# Patient Record
Sex: FEMALE | Race: WHITE | Employment: UNEMPLOYED | URBAN - METROPOLITAN AREA
[De-identification: names, ages, dates, MRNs, and addresses within clinical notes are randomized per-mention and may not be internally consistent; named-entity substitution may affect disease eponyms.]

---

## 2024-03-13 ENCOUNTER — HOSPITAL ENCOUNTER (OUTPATIENT)
Age: 54
Discharge: HOME | End: 2024-03-13

## 2024-03-14 ENCOUNTER — HOSPITAL ENCOUNTER (OUTPATIENT)
Age: 54
Setting detail: OBSERVATION
Discharge: HOME | End: 2024-03-15
Attending: INTERNAL MEDICINE | Admitting: INTERNAL MEDICINE
Payer: COMMERCIAL

## 2024-03-14 ENCOUNTER — HOSPITAL ENCOUNTER (OUTPATIENT)
Dept: CT IMAGING | Age: 54
Discharge: HOME | End: 2024-03-14
Payer: COMMERCIAL

## 2024-03-14 VITALS
BODY MASS INDEX: 23.32 KG/M2 | RESPIRATION RATE: 16 BRPM | HEIGHT: 65 IN | TEMPERATURE: 97.1 F | HEART RATE: 85 BPM | OXYGEN SATURATION: 96 % | WEIGHT: 140 LBS | SYSTOLIC BLOOD PRESSURE: 108 MMHG | DIASTOLIC BLOOD PRESSURE: 72 MMHG

## 2024-03-14 DIAGNOSIS — C79.51 SECONDARY MALIGNANT NEOPLASM OF BONE AND BONE MARROW (HCC): ICD-10-CM

## 2024-03-14 DIAGNOSIS — Z98.890 POST-OPERATIVE STATE: Primary | ICD-10-CM

## 2024-03-14 DIAGNOSIS — C79.52 SECONDARY MALIGNANT NEOPLASM OF BONE AND BONE MARROW (HCC): ICD-10-CM

## 2024-03-14 PROBLEM — I10 HYPERTENSION: Status: ACTIVE | Noted: 2024-03-14

## 2024-03-14 PROBLEM — E78.5 HYPERLIPIDEMIA: Status: ACTIVE | Noted: 2024-03-14

## 2024-03-14 LAB
DEPRECATED RDW RBC AUTO: 16 % (ref 12.4–15.4)
FLUAV RNA RESP QL NAA+PROBE: DETECTED
FLUBV RNA RESP QL NAA+PROBE: NOT DETECTED
HCG UR QL: NEGATIVE
HCT VFR BLD AUTO: 40.2 % (ref 36–48)
HGB BLD-MCNC: 13.3 G/DL (ref 12–16)
INR PPP: 0.99 (ref 0.84–1.16)
MCH RBC QN AUTO: 28.9 PG (ref 26–34)
MCHC RBC AUTO-ENTMCNC: 33.1 G/DL (ref 31–36)
MCV RBC AUTO: 87.2 FL (ref 80–100)
PLATELET # BLD AUTO: 274 K/UL (ref 135–450)
PMV BLD AUTO: 7.9 FL (ref 5–10.5)
PROTHROMBIN TIME: 13.1 SEC (ref 11.5–14.8)
RBC # BLD AUTO: 4.61 M/UL (ref 4–5.2)
SARS-COV-2 RDRP RESP QL NAA+PROBE: NOT DETECTED
SARS-COV-2 RNA RESP QL NAA+PROBE: NOT DETECTED
WBC # BLD AUTO: 5.3 K/UL (ref 4–11)

## 2024-03-14 PROCEDURE — 6370000000 HC RX 637 (ALT 250 FOR IP)

## 2024-03-14 PROCEDURE — 85610 PROTHROMBIN TIME: CPT

## 2024-03-14 PROCEDURE — 96374 THER/PROPH/DIAG INJ IV PUSH: CPT

## 2024-03-14 PROCEDURE — 87635 SARS-COV-2 COVID-19 AMP PRB: CPT

## 2024-03-14 PROCEDURE — 87636 SARSCOV2 & INF A&B AMP PRB: CPT

## 2024-03-14 PROCEDURE — 6360000002 HC RX W HCPCS: Performed by: INTERNAL MEDICINE

## 2024-03-14 PROCEDURE — 2700000000 HC OXYGEN THERAPY PER DAY

## 2024-03-14 PROCEDURE — 6370000000 HC RX 637 (ALT 250 FOR IP): Performed by: INTERNAL MEDICINE

## 2024-03-14 PROCEDURE — G0378 HOSPITAL OBSERVATION PER HR: HCPCS

## 2024-03-14 PROCEDURE — G0379 DIRECT REFER HOSPITAL OBSERV: HCPCS

## 2024-03-14 PROCEDURE — 36415 COLL VENOUS BLD VENIPUNCTURE: CPT

## 2024-03-14 PROCEDURE — 2580000003 HC RX 258

## 2024-03-14 PROCEDURE — 94640 AIRWAY INHALATION TREATMENT: CPT

## 2024-03-14 PROCEDURE — 84703 CHORIONIC GONADOTROPIN ASSAY: CPT

## 2024-03-14 PROCEDURE — 2709999900 CT BIOPSY DEEP BONE PERCUTANEOUS

## 2024-03-14 PROCEDURE — 6360000002 HC RX W HCPCS: Performed by: STUDENT IN AN ORGANIZED HEALTH CARE EDUCATION/TRAINING PROGRAM

## 2024-03-14 PROCEDURE — 99221 1ST HOSP IP/OBS SF/LOW 40: CPT

## 2024-03-14 PROCEDURE — 85027 COMPLETE CBC AUTOMATED: CPT

## 2024-03-14 PROCEDURE — 94761 N-INVAS EAR/PLS OXIMETRY MLT: CPT

## 2024-03-14 RX ORDER — POTASSIUM CHLORIDE 7.45 MG/ML
10 INJECTION INTRAVENOUS PRN
Status: DISCONTINUED | OUTPATIENT
Start: 2024-03-14 | End: 2024-03-15 | Stop reason: HOSPADM

## 2024-03-14 RX ORDER — ACETAMINOPHEN 325 MG/1
650 TABLET ORAL EVERY 6 HOURS PRN
Status: DISCONTINUED | OUTPATIENT
Start: 2024-03-14 | End: 2024-03-15 | Stop reason: HOSPADM

## 2024-03-14 RX ORDER — SODIUM CHLORIDE 0.9 % (FLUSH) 0.9 %
5-40 SYRINGE (ML) INJECTION PRN
Status: DISCONTINUED | OUTPATIENT
Start: 2024-03-14 | End: 2024-03-15 | Stop reason: HOSPADM

## 2024-03-14 RX ORDER — OXYCODONE HYDROCHLORIDE AND ACETAMINOPHEN 5; 325 MG/1; MG/1
1 TABLET ORAL EVERY 4 HOURS PRN
Status: DISCONTINUED | OUTPATIENT
Start: 2024-03-14 | End: 2024-03-15 | Stop reason: HOSPADM

## 2024-03-14 RX ORDER — OMEPRAZOLE 20 MG/1
20 CAPSULE, DELAYED RELEASE ORAL DAILY
Status: DISCONTINUED | OUTPATIENT
Start: 2024-03-14 | End: 2024-03-15 | Stop reason: HOSPADM

## 2024-03-14 RX ORDER — FENTANYL CITRATE 50 UG/ML
INJECTION, SOLUTION INTRAMUSCULAR; INTRAVENOUS PRN
Status: COMPLETED | OUTPATIENT
Start: 2024-03-14 | End: 2024-03-14

## 2024-03-14 RX ORDER — POLYETHYLENE GLYCOL 3350 17 G/17G
17 POWDER, FOR SOLUTION ORAL DAILY PRN
Status: DISCONTINUED | OUTPATIENT
Start: 2024-03-14 | End: 2024-03-15 | Stop reason: HOSPADM

## 2024-03-14 RX ORDER — ONDANSETRON 4 MG/1
4 TABLET, ORALLY DISINTEGRATING ORAL EVERY 8 HOURS PRN
Status: DISCONTINUED | OUTPATIENT
Start: 2024-03-14 | End: 2024-03-15 | Stop reason: HOSPADM

## 2024-03-14 RX ORDER — ACETAMINOPHEN 325 MG/1
650 TABLET ORAL EVERY 4 HOURS PRN
Status: DISCONTINUED | OUTPATIENT
Start: 2024-03-14 | End: 2024-03-14

## 2024-03-14 RX ORDER — ALBUTEROL SULFATE 90 UG/1
2 AEROSOL, METERED RESPIRATORY (INHALATION)
Status: DISCONTINUED | OUTPATIENT
Start: 2024-03-14 | End: 2024-03-15 | Stop reason: HOSPADM

## 2024-03-14 RX ORDER — SODIUM CHLORIDE 0.9 % (FLUSH) 0.9 %
5-40 SYRINGE (ML) INJECTION EVERY 12 HOURS SCHEDULED
Status: DISCONTINUED | OUTPATIENT
Start: 2024-03-14 | End: 2024-03-15 | Stop reason: HOSPADM

## 2024-03-14 RX ORDER — ONDANSETRON 2 MG/ML
4 INJECTION INTRAMUSCULAR; INTRAVENOUS EVERY 8 HOURS PRN
Status: DISCONTINUED | OUTPATIENT
Start: 2024-03-14 | End: 2024-03-14

## 2024-03-14 RX ORDER — HYDROCODONE BITARTRATE AND ACETAMINOPHEN 5; 325 MG/1; MG/1
1 TABLET ORAL EVERY 6 HOURS PRN
Status: DISCONTINUED | OUTPATIENT
Start: 2024-03-14 | End: 2024-03-15 | Stop reason: HOSPADM

## 2024-03-14 RX ORDER — ALBUTEROL SULFATE 90 UG/1
2 AEROSOL, METERED RESPIRATORY (INHALATION) EVERY 6 HOURS PRN
Status: DISCONTINUED | OUTPATIENT
Start: 2024-03-14 | End: 2024-03-15 | Stop reason: HOSPADM

## 2024-03-14 RX ORDER — POTASSIUM CHLORIDE 20 MEQ/1
40 TABLET, EXTENDED RELEASE ORAL PRN
Status: DISCONTINUED | OUTPATIENT
Start: 2024-03-14 | End: 2024-03-15 | Stop reason: HOSPADM

## 2024-03-14 RX ORDER — NICOTINE 21 MG/24HR
1 PATCH, TRANSDERMAL 24 HOURS TRANSDERMAL DAILY
Status: DISCONTINUED | OUTPATIENT
Start: 2024-03-14 | End: 2024-03-15 | Stop reason: HOSPADM

## 2024-03-14 RX ORDER — SODIUM CHLORIDE 9 MG/ML
INJECTION, SOLUTION INTRAVENOUS PRN
Status: DISCONTINUED | OUTPATIENT
Start: 2024-03-14 | End: 2024-03-15 | Stop reason: HOSPADM

## 2024-03-14 RX ORDER — SODIUM CHLORIDE 0.9 % (FLUSH) 0.9 %
10 SYRINGE (ML) INJECTION PRN
Status: DISCONTINUED | OUTPATIENT
Start: 2024-03-14 | End: 2024-03-15 | Stop reason: HOSPADM

## 2024-03-14 RX ORDER — BENZONATATE 100 MG/1
200 CAPSULE ORAL 3 TIMES DAILY
Status: DISCONTINUED | OUTPATIENT
Start: 2024-03-14 | End: 2024-03-15 | Stop reason: HOSPADM

## 2024-03-14 RX ORDER — MORPHINE SULFATE 2 MG/ML
2 INJECTION, SOLUTION INTRAMUSCULAR; INTRAVENOUS ONCE
Status: COMPLETED | OUTPATIENT
Start: 2024-03-14 | End: 2024-03-14

## 2024-03-14 RX ORDER — ACETAMINOPHEN 650 MG/1
650 SUPPOSITORY RECTAL EVERY 6 HOURS PRN
Status: DISCONTINUED | OUTPATIENT
Start: 2024-03-14 | End: 2024-03-15 | Stop reason: HOSPADM

## 2024-03-14 RX ORDER — ATORVASTATIN CALCIUM 40 MG/1
40 TABLET, FILM COATED ORAL DAILY
Status: DISCONTINUED | OUTPATIENT
Start: 2024-03-14 | End: 2024-03-15 | Stop reason: HOSPADM

## 2024-03-14 RX ORDER — ONDANSETRON 2 MG/ML
4 INJECTION INTRAMUSCULAR; INTRAVENOUS EVERY 6 HOURS PRN
Status: DISCONTINUED | OUTPATIENT
Start: 2024-03-14 | End: 2024-03-15 | Stop reason: HOSPADM

## 2024-03-14 RX ORDER — OXYCODONE HYDROCHLORIDE AND ACETAMINOPHEN 5; 325 MG/1; MG/1
2 TABLET ORAL EVERY 4 HOURS PRN
Status: DISCONTINUED | OUTPATIENT
Start: 2024-03-14 | End: 2024-03-15 | Stop reason: HOSPADM

## 2024-03-14 RX ORDER — MIDAZOLAM HYDROCHLORIDE 1 MG/ML
INJECTION INTRAMUSCULAR; INTRAVENOUS PRN
Status: COMPLETED | OUTPATIENT
Start: 2024-03-14 | End: 2024-03-14

## 2024-03-14 RX ORDER — MAGNESIUM SULFATE 1 G/100ML
1000 INJECTION INTRAVENOUS PRN
Status: DISCONTINUED | OUTPATIENT
Start: 2024-03-14 | End: 2024-03-15 | Stop reason: HOSPADM

## 2024-03-14 RX ADMIN — Medication 2 PUFF: at 19:53

## 2024-03-14 RX ADMIN — FENTANYL CITRATE 50 MCG: 50 INJECTION INTRAMUSCULAR; INTRAVENOUS at 10:42

## 2024-03-14 RX ADMIN — DESMOPRESSIN ACETATE 40 MG: 0.2 TABLET ORAL at 16:58

## 2024-03-14 RX ADMIN — FENTANYL CITRATE 50 MCG: 50 INJECTION INTRAMUSCULAR; INTRAVENOUS at 10:53

## 2024-03-14 RX ADMIN — MIDAZOLAM 1 MG: 1 INJECTION INTRAMUSCULAR; INTRAVENOUS at 10:42

## 2024-03-14 RX ADMIN — MIDAZOLAM 1 MG: 1 INJECTION INTRAMUSCULAR; INTRAVENOUS at 10:54

## 2024-03-14 RX ADMIN — MORPHINE SULFATE 2 MG: 2 INJECTION, SOLUTION INTRAMUSCULAR; INTRAVENOUS at 22:07

## 2024-03-14 RX ADMIN — BENZONATATE 200 MG: 100 CAPSULE ORAL at 22:08

## 2024-03-14 RX ADMIN — OMEPRAZOLE 20 MG: 20 CAPSULE, DELAYED RELEASE ORAL at 16:58

## 2024-03-14 RX ADMIN — Medication 10 ML: at 22:07

## 2024-03-14 RX ADMIN — HYDROCODONE BITARTRATE AND ACETAMINOPHEN 1 TABLET: 5; 325 TABLET ORAL at 16:58

## 2024-03-14 ASSESSMENT — PAIN SCALES - GENERAL
PAINLEVEL_OUTOF10: 9
PAINLEVEL_OUTOF10: 0

## 2024-03-14 ASSESSMENT — PAIN - FUNCTIONAL ASSESSMENT
PAIN_FUNCTIONAL_ASSESSMENT: PREVENTS OR INTERFERES SOME ACTIVE ACTIVITIES AND ADLS
PAIN_FUNCTIONAL_ASSESSMENT: NONE - DENIES PAIN

## 2024-03-14 ASSESSMENT — PAIN DESCRIPTION - ORIENTATION: ORIENTATION: MID

## 2024-03-14 ASSESSMENT — PAIN DESCRIPTION - LOCATION: LOCATION: BACK

## 2024-03-14 ASSESSMENT — PAIN DESCRIPTION - DESCRIPTORS: DESCRIPTORS: SHARP

## 2024-03-14 NOTE — PRE SEDATION
Sedation Pre-Procedure Note    Patient Name: Neyda Weinberg   YOB: 1970  Room/Bed: Room/bed info not found  Medical Record Number: [id number]  Date: 3/14/2024   Time: 10:25 AM       Indication:  T6 sclerosis, concerning for metastatic disease. Image guided sampling requested for further evaluation.     Consent: I have discussed with the patient and/or the patient representative the indication, alternatives, and the possible risks and/or complications of the planned procedure and the anesthesia methods. The patient and/or patient representative appear to understand and agree to proceed.    Vital Signs:   Vitals:    24 0736   BP: 115/66   Pulse: 84   Resp: 16   Temp: 97.1 °F (36.2 °C)   SpO2: 95%       Past Medical History:   has a past medical history of Aneurysm (HCC), Cervical mass, Lung cancer, upper lobe (HCC), and Lung nodule.    Past Surgical History:   has a past surgical history that includes Cholecystectomy, laparoscopic (3-); Breast surgery (-);  section; and Tubal ligation.    Medications:   Scheduled Meds:    sodium chloride flush  5-40 mL IntraVENous 2 times per day     Continuous Infusions:    sodium chloride       PRN Meds: sodium chloride flush, sodium chloride  Home Meds:   Prior to Admission medications    Medication Sig Start Date End Date Taking? Authorizing Provider   acetaminophen (TYLENOL) 80 MG chewable tablet Take 650 mg by mouth every 4 hours as needed for Pain    Cl Romano MD   HYDROcodone-acetaminophen (NORCO) 7.5-325 MG per tablet Take 1 tablet by mouth every 6 hours as needed for Pain.    Cl Romano MD   lisinopril (PRINIVIL;ZESTRIL) 5 MG tablet Take 1 tablet by mouth daily    Cl Romano MD   mometasone (ASMANEX) 200 MCG/ACT AERO inhaler Inhale 2 puffs into the lungs 2 times daily    Cl Romano MD   pregabalin (LYRICA) 150 MG capsule Take 1 capsule by mouth 2 times daily.    Cl Romano MD

## 2024-03-14 NOTE — H&P
Hospital Medicine History & Physical      PCP: No primary care provider on file.    Date of Admission: 3/14/2024    Date of Service: Pt seen/examined on 24      Chief Complaint:  No chief complaint on file.        History Of Present Illness:      The patient is a 53 y.o. female with PMHx HLD, HTN, and iron deficiency anemia currently being evaluated for suspected malignancy with T6 lesion and lung nodule. Patient admitted to [facility] following IR guided biopsy of T6 lesion this afternoon. Patient currently resides in homeless shelter without transportation and therefore was unable to safely be discharged after receiving anesthesia. Patient endorses mild pain, but otherwise, no fevers, chills, n/v/d, abdominal pain, chest pain or SOB.      Past Medical History:        Diagnosis Date    Aneurysm (HCC)     has a coil    Cervical mass Dx in     Lung cancer, upper lobe (HCC)     RUL, dx 1 yr ago    Lung nodule Dx  in     repeat CT scan in 6 months       Past Surgical History:        Procedure Laterality Date    BREAST SURGERY  -    abscess removed     SECTION      x 3    CHOLECYSTECTOMY, LAPAROSCOPIC  3-2009    CT BIOPSY PERCUTANEOUS DEEP BONE  3/14/2024    CT BIOPSY PERCUTANEOUS DEEP BONE 3/14/2024 [facility] [place] SCAN    TUBAL LIGATION         Medications Prior to Admission:    Prior to Admission medications    Medication Sig Start Date End Date Taking? Authorizing Provider   acetaminophen (TYLENOL) 80 MG chewable tablet Take 650 mg by mouth every 4 hours as needed for Pain    Cl Romano MD   HYDROcodone-acetaminophen (NORCO) 7.5-325 MG per tablet Take 1 tablet by mouth every 6 hours as needed for Pain.    ProviderCl MD   lisinopril (PRINIVIL;ZESTRIL) 5 MG tablet Take 1 tablet by mouth daily    Cl Romano MD   mometasone (ASMANEX) 200 MCG/ACT AERO inhaler Inhale 2 puffs into the lungs 2 times daily    Cl Romano MD   pregabalin (LYRICA) 150 MG

## 2024-03-14 NOTE — PROGRESS NOTES
RT Inhaler-Nebulizer Bronchodilator Protocol Note    There is a bronchodilator order in the chart from a provider indicating to follow the RT Bronchodilator Protocol and there is an “Initiate RT Inhaler-Nebulizer Bronchodilator Protocol” order as well (see protocol at bottom of note).    CXR Findings:  No results found.    The findings from the last RT Protocol Assessment were as follows:   History Pulmonary Disease: (P) Smoker 15 pack years or more  Respiratory Pattern: (P) Dyspnea on exertion or RR 21-25 bpm  Breath Sounds: (P) Inspiratory and expiratory or bilateral wheezing and/or rhonchi  Cough: (P) Strong, productive  Indication for Bronchodilator Therapy: (P) Wheezing associated with pulm disorder  Bronchodilator Assessment Score: (P) 10    Aerosolized bronchodilator medication orders have been revised according to the RT Inhaler-Nebulizer Bronchodilator Protocol below.    Respiratory Therapist to perform RT Therapy Protocol Assessment initially then follow the protocol.  Repeat RT Therapy Protocol Assessment PRN for score 0-3 or on second treatment, BID, and PRN for scores above 3.    No Indications - adjust the frequency to every 6 hours PRN wheezing or bronchospasm, if no treatments needed after 48 hours then discontinue using Per Protocol order mode.     If indication present, adjust the RT bronchodilator orders based on the Bronchodilator Assessment Score as indicated below.  Use Inhaler orders unless patient has one or more of the following: on home nebulizer, not able to hold breath for 10 seconds, is not alert and oriented, cannot activate and use MDI correctly, or respiratory rate 25 breaths per minute or more, then use the equivalent nebulizer order(s) with same Frequency and PRN reasons based on the score.  If a patient is on this medication at home then do not decrease Frequency below that used at home.    0-3 - enter or revise RT bronchodilator order(s) to equivalent RT Bronchodilator order with

## 2024-03-14 NOTE — FLOWSHEET NOTE
03/14/24 1419   Vital Signs   Temp 97.9 °F (36.6 °C)   Temp Source Oral   Pulse 86   Heart Rate Source Monitor   Respirations 18   /64   MAP (Calculated) 77   BP Location Left upper arm   BP Method Automatic   Patient Position Semi fowlers   Pain Assessment   Pain Assessment None - Denies Pain   Opioid-Induced Sedation   POSS Score 1   RASS   White Agitation Sedation Scale (RASS) 0   Oxygen Therapy   SpO2 91 %   O2 Device Nasal cannula   O2 Flow Rate (L/min) 1 L/min   Height and Weight   Height 1.631 m (5' 4.21\")   Weight - Scale 63.7 kg (140 lb 6.9 oz)   BSA (Calculated - sq m) 1.7 sq meters   BMI (Calculated) 24     Pt arrived to room 214-2 from OR/PACU. Orders released and admission questions started. VS complete, see flow sheet. Call light within reach, bed locked and in lowest position. Bed alarm in place. No further needs noted.    Recliner  Patient is able to demonstrate the ability to move from a reclining position to an upright position within the recliner.     4 Eyes Skin Assessment     The patient is being assess for   Admission    I agree that 2 RN's have performed a thorough Head to Toe Skin Assessment on the patient. ALL assessment sites listed below have been assessed.      Areas assessed for pressure by both nurses:   [x]   Head, Face, and Ears   [x]   Shoulders, Back, and Chest, Abdomen  [x]   Arms, Elbows, and Hands   []   Coccyx, Sacrum, and Ischium  [x]   Legs, Feet, and Heels    Patient has scattered bruises and abrasions. Incision on back from procedure, covered with band-aid. Patient denies any other wounds.        Skin Assessed Under all Medical Devices by both nurses:  O2 device tubing              All Mepilex Borders were peeled back and area peeked at by both nurses:  No: N/A  Please list where Mepilex Borders are located:  N/A             **SHARE this note so that the co-signing nurse is able to place an eSignature**    Co-signer eSignature: Electronically signed by Krishna  Inskeep, RN on 3/14/24 at 3:12 PM EDT    Does the Patient have Skin Breakdown related to pressure?  No     (Insert Photo here n/a)         Rohan Prevention initiated:  No   Wound Care Orders initiated:  No      WOC nurse consulted for Pressure Injury (Stage 3,4, Unstageable, DTI, NWPT, Complex wounds)and New or Established Ostomies:  No      Primary Nurse eSignature: Electronically signed by Lizz Monique RN (Mandy) on 3/14/24 at 2:23 PM EDT

## 2024-03-14 NOTE — OR NURSING
Image guided bone lesion biopsy T6 completed by Dr. Cintron. Pt tolerated procedure without any signs or symptoms of distress. Vital signs stable. Report given  to [facility] RN. Pt transported back to [facility] in stable condition via stretcher.     Total Biopsy: 2  Received: Versed: 2 mg       Fentanyl: 100 mcg  Bandage to back that is clean dry and intact.   Patient to lay on back side for 1 hour.     Vital Signs  Vitals:    03/14/24 1109   BP: 101/60   Pulse: 77   Resp: 14   Temp:    SpO2: 99%    (vital signs in table format)    Post Kodak  2 - Able to move 4 extremities voluntarily on command  2 - BP+/- 20mmHg of normal  2 - Fully awake  2 - Able to maintain oxygen saturation >92% on room air  2 - Able to breathe deeply and cough freely

## 2024-03-14 NOTE — PROGRESS NOTES
Handoff report and transfer of care given at bedside to [facility].  Patient in stable condition, denies needs/concerns at this time.  Call light within reach.

## 2024-03-14 NOTE — BRIEF OP NOTE
Brief Postoperative Note    Patient: Neyda Weinberg  YOB: 1970  MRN: [id number]      Pre-operative Diagnosis: T6 vertebral body lesion    Post-operative Diagnosis: Same    Procedure: CT guided bone lesion biopsy    Anesthesia: Local and Moderate Sedation    Surgeons/Assistants: Hira Cintron DO    Estimated Blood Loss: less than 50     Complications: None    Specimens: Was Obtained: two core samples    Findings:   Successful CT guided T6 bone lesion biopsy via a left transpedicular approach.  Post biopsy CT images demonstrated no complication.    Patient to be recovered in [facility] then admitted overnight for observation as per extensive discussion prior to biopsy given social circumstances.       Hira Cintron DO  3/14/2024, 11:13 AM  Interventional Radiology  [phone] ([phone])

## 2024-03-14 NOTE — ACP (ADVANCE CARE PLANNING)
Advance Care Planning     General Advance Care Planning (ACP) Conversation    Date of Conversation: 3/14/2024  Conducted with: Patient with Decision Making Capacity    Healthcare Decision Maker:  No healthcare decision makers have been documented.  Click here to complete HealthCare Decision Makers including selection of the Healthcare Decision Maker Relationship (ie \"Primary\")   Today we documented Decision Maker(s) consistent with Legal Next of Kin hierarchy.    Content/Action Overview:  DECLINED ACP Conversation - will revisit periodically  Reviewed DNR/DNI and patient elects Full Code (Attempt Resuscitation)        Length of Voluntary ACP Conversation in minutes:  <16 minutes (Non-Billable)    Tasha Penny

## 2024-03-14 NOTE — OR NURSING
Pt arrived for image guided bone lesion biopsy T6. Procedure explained including the risk and benefits of the procedure. All questions answered. Pt verbalizes understanding of the of procedure and states no more questions. Consent signed. Vital signs stable, labs, allergies, medications, and code status reviewed. No contraindications noted.     Vitals:    03/14/24 1034   BP: 111/62   Pulse: 83   Resp: 14   Temp:    SpO2: 97%    (vital signs in table format)    Kodak Score  2 - Able to move 4 extremities voluntarily on command  2 - BP+/- 20mmHg of normal  2 - Fully awake  2 - Able to maintain oxygen saturation >92% on room air  2 - Able to breathe deeply and cough freely    Allergies  Rocephin [ceftriaxone] and Toradol [ketorolac tromethamine]    Labs  Lab Results   Component Value Date    INR 0.99 03/14/2024    PROTIME 13.1 03/14/2024       No results found for: \"CREATININE\", \"BUN\", \"GFR\", \"NA\", \"K\", \"CL\", \"CO2\"    Lab Results   Component Value Date    WBC 5.3 03/14/2024    HGB 13.3 03/14/2024    HCT 40.2 03/14/2024    MCV 87.2 03/14/2024     03/14/2024

## 2024-03-14 NOTE — CARE COORDINATION
Case Management Assessment  Initial Evaluation    Date/Time of Evaluation: 3/14/2024 2:47 PM  Assessment Completed by: Tasha Penny    If patient is discharged prior to next notation, then this note serves as note for discharge by case management.    Patient Name: Neyda Weinberg                   YOB: 1970  Diagnosis: Post-operative state [Z98.890]                   Date / Time: 3/14/2024  2:00 PM    Patient Admission Status: Observation   Readmission Risk (Low < 19, Mod (19-27), High > 27): No data recorded  Current PCP: No primary care provider on file.  PCP verified by CM? Yes ([facility])    Chart Reviewed: Yes      History Provided by: Patient  Patient Orientation: Alert and Oriented    Patient Cognition: Alert    Hospitalization in the last 30 days (Readmission):  Yes    If yes, Readmission Assessment in CM Navigator will be completed.    Advance Directives:      Code Status: Prior   Patient's Primary Decision Maker is: Patient Declined (Legal Next of Kin Remains as Decision Maker)      Discharge Planning:    Patient lives with: Other (Comment) (shelter) Type of Home: Other (Comment) (homeless shelter)  Primary Care Giver: Self  Patient Support Systems include: Friends/Neighbors   Current Financial resources: Medicaid  Current community resources: Housing (Homeless Shelter)  Current services prior to admission: None            Current DME:              Type of Home Care services:  None    ADLS  Prior functional level: Independent in ADLs/IADLs  Current functional level: Independent in ADLs/IADLs    PT AM-PAC:   /24  OT AM-PAC:   /24    Family can provide assistance at DC: No  Would you like Case Management to discuss the discharge plan with any other family members/significant others, and if so, who? No  Plans to Return to Present Housing: Yes  Other Identified Issues/Barriers to RETURNING to current housing: none  Potential Assistance needed at discharge: N/A             Potential DME:    Patient expects to discharge to: Shelter  Plan for transportation at discharge:      Financial    Payor: [facility] / Plan: [facility] [place] / Product Type: *No Product type* /     Does insurance require precert for SNF: Yes    Potential assistance Purchasing Medications: No  Meds-to-Beds request:        [id number] - [place], [place] - [address] - P [phone] - F [phone]  [address] [address]  Phone: [phone] Fax: [phone]      Notes:    Factors facilitating achievement of predicted outcomes: Motivated, Cooperative, and Pleasant    Barriers to discharge: none - pain meds need to wear off    Additional Case Management Notes: Reviewed chart and met with pt at [facility]. From [facility], will return at NH. Pt has number and can set her own transportation up thru [employer] ([phone] - lori).  IPTA. No DME or HC PTA. No needs identified at this time. Will continue to monitor.     The Plan for Transition of Care is related to the following treatment goals of Post-operative state [Z98.890]    IF APPLICABLE: The Patient and/or patient representative Neyda and her family were provided with a choice of provider and agrees with the discharge plan. Freedom of choice list with basic dialogue that supports the patient's individualized plan of care/goals and shares the quality data associated with the providers was provided to:     Patient Representative Name:       The Patient and/or Patient Representative Agree with the Discharge Plan?      Tasha Penny  Case Management Department  Ph: [phone] Fax: [phone]

## 2024-03-15 VITALS
OXYGEN SATURATION: 96 % | DIASTOLIC BLOOD PRESSURE: 69 MMHG | HEIGHT: 64 IN | SYSTOLIC BLOOD PRESSURE: 109 MMHG | HEART RATE: 75 BPM | RESPIRATION RATE: 16 BRPM | WEIGHT: 143.31 LBS | BODY MASS INDEX: 24.46 KG/M2 | TEMPERATURE: 98 F

## 2024-03-15 PROCEDURE — 6370000000 HC RX 637 (ALT 250 FOR IP): Performed by: INTERNAL MEDICINE

## 2024-03-15 PROCEDURE — G0378 HOSPITAL OBSERVATION PER HR: HCPCS

## 2024-03-15 PROCEDURE — 99238 HOSP IP/OBS DSCHRG MGMT 30/<: CPT

## 2024-03-15 PROCEDURE — 94640 AIRWAY INHALATION TREATMENT: CPT

## 2024-03-15 PROCEDURE — 2700000000 HC OXYGEN THERAPY PER DAY

## 2024-03-15 PROCEDURE — 94761 N-INVAS EAR/PLS OXIMETRY MLT: CPT

## 2024-03-15 PROCEDURE — 6370000000 HC RX 637 (ALT 250 FOR IP)

## 2024-03-15 RX ORDER — BUDESONIDE AND FORMOTEROL FUMARATE DIHYDRATE 160; 4.5 UG/1; UG/1
2 AEROSOL RESPIRATORY (INHALATION)
Status: DISCONTINUED | OUTPATIENT
Start: 2024-03-15 | End: 2024-03-15 | Stop reason: HOSPADM

## 2024-03-15 RX ORDER — HYDROCODONE BITARTRATE AND ACETAMINOPHEN 5; 325 MG/1; MG/1
1 TABLET ORAL EVERY 8 HOURS PRN
Qty: 9 TABLET | Refills: 0 | Status: SHIPPED | OUTPATIENT
Start: 2024-03-15 | End: 2024-03-18

## 2024-03-15 RX ADMIN — DESMOPRESSIN ACETATE 40 MG: 0.2 TABLET ORAL at 09:28

## 2024-03-15 RX ADMIN — BENZONATATE 200 MG: 100 CAPSULE ORAL at 09:28

## 2024-03-15 RX ADMIN — Medication 2 PUFF: at 07:21

## 2024-03-15 RX ADMIN — HYDROCODONE BITARTRATE AND ACETAMINOPHEN 1 TABLET: 5; 325 TABLET ORAL at 09:28

## 2024-03-15 RX ADMIN — OMEPRAZOLE 20 MG: 20 CAPSULE, DELAYED RELEASE ORAL at 09:28

## 2024-03-15 ASSESSMENT — PAIN - FUNCTIONAL ASSESSMENT: PAIN_FUNCTIONAL_ASSESSMENT: PREVENTS OR INTERFERES SOME ACTIVE ACTIVITIES AND ADLS

## 2024-03-15 ASSESSMENT — PAIN DESCRIPTION - PAIN TYPE: TYPE: CHRONIC PAIN

## 2024-03-15 ASSESSMENT — PAIN DESCRIPTION - LOCATION: LOCATION: BACK;RIB CAGE

## 2024-03-15 ASSESSMENT — PAIN DESCRIPTION - ONSET: ONSET: ON-GOING

## 2024-03-15 ASSESSMENT — PAIN DESCRIPTION - FREQUENCY: FREQUENCY: CONTINUOUS

## 2024-03-15 ASSESSMENT — PAIN SCALES - GENERAL: PAINLEVEL_OUTOF10: 9

## 2024-03-15 ASSESSMENT — PAIN DESCRIPTION - ORIENTATION: ORIENTATION: MID

## 2024-03-15 ASSESSMENT — PAIN DESCRIPTION - DESCRIPTORS: DESCRIPTORS: SHARP

## 2024-03-15 NOTE — FLOWSHEET NOTE
03/15/24 0915   Vital Signs   Temp 98 °F (36.7 °C)   Temp Source Oral   Pulse 75   Heart Rate Source Monitor   Respirations 16   /69   MAP (Calculated) 82   BP Location Right upper arm   BP Method Automatic   Patient Position Semi fowlers   Pain Assessment   Pain Assessment 0-10   Pain Level 9   Pain Location Back;Rib cage   Pain Orientation Mid   Pain Descriptors Sharp   Functional Pain Assessment Prevents or interferes some active activities and ADLs   Pain Type Chronic pain   Pain Frequency Continuous   Pain Onset On-going   Opioid-Induced Sedation   POSS Score 1   Oxygen Therapy   SpO2 96 %   O2 Device None (Room air)     Shift assessment complete. See flow sheet. Scheduled medications given, See MAR.   Head to toe complete. Vital signs logged and active bowel sounds in all 4 quadrants.    Pt awake in bed. Medications taken without difficulty. PRN pain medication given, see MAR.       No further needs noted at this time. Call light and bedside table within reach. Bed in lowest position, wheels locked and side rails up x2.     Amy Self RN

## 2024-03-15 NOTE — FLOWSHEET NOTE
03/14/24 2207   Pain Assessment   Pain Assessment 0-10   Pain Level 9   Patient's Stated Pain Goal 0 - No pain   Pain Location Back   Pain Orientation Mid   Pain Descriptors Sharp   Functional Pain Assessment Prevents or interferes some active activities and ADLs   Non-Pharmaceutical Pain Intervention(s) Repositioned     Morphine 2 mg IV given once as ordered. Tessalon given for cough. Refer to MAR.

## 2024-03-15 NOTE — PLAN OF CARE
Problem: Discharge Planning  Goal: Discharge to home or other facility with appropriate resources  3/15/2024 1030 by Amy Self, RN  Outcome: Progressing  Flowsheets (Taken 3/15/2024 0927)  Discharge to home or other facility with appropriate resources: Identify barriers to discharge with patient and caregiver  3/14/2024 2326 by Kvng Winkler, RN  Outcome: Progressing  Flowsheets (Taken 3/14/2024 2107)  Discharge to home or other facility with appropriate resources: Identify barriers to discharge with patient and caregiver     Problem: Pain  Goal: Verbalizes/displays adequate comfort level or baseline comfort level  3/15/2024 1030 by Amy Self, RN  Outcome: Progressing  3/14/2024 2326 by Kvng Winkler RN  Outcome: Progressing

## 2024-03-15 NOTE — PROGRESS NOTES
IV removed. Catheter intact, dressing applied and pt tolerated well. Discharge instructions completed and AVS given to pt. Verbalized understanding.

## 2024-03-15 NOTE — DISCHARGE INSTRUCTIONS
Your information:  Name: Neyda Weinberg  : 1970    Your instructions:    Follow up with PCP in 1 week      What to do after you leave the hospital:    Recommended diet: regular diet    Recommended activity: activity as tolerated        The following personal items were collected during your admission and were returned to you:    Belongings  Dental Appliances: None  Vision - Corrective Lenses: Eyeglasses, At bedside  Hearing Aid: None  Clothing: Footwear, Undergarments, Pants, Shirt, At bedside  Jewelry: Ring  Electronic Devices: Cell Phone  Weapons (Notify Protective Services/Security): None  Home Medications: None  Valuables Given To: Other (Comment) (n/a)  Provide Name(s) of Who Valuable(s) Were Given To: n/a    Information obtained by:  By signing below, I understand that if any problems occur once I leave the hospital I am to contact PCP.  I understand and acknowledge receipt of the instructions indicated above.

## 2024-03-15 NOTE — PROGRESS NOTES
/83   Pulse 98   Temp 98.8 °F (37.1 °C) (Oral)   Resp 18   Ht 1.631 m (5' 4.21\")   Wt 65 kg (143 lb 5 oz)   SpO2 98%   BMI 24.44 kg/m²     Patient alert and oriented, resting in bed. Assessment completed. Respiration easy, even and unlabored.  Patient complained of pain over the back, pain score of 9/10. Patient has prn norco q6, not available till 11 pm. Also complains of cough. No prn ordered.  Notified Dr. Delgado via perfect serve. Call light and bedside table within reach. Bed at lowest position, locked, side rails x2.  Pt denies needs at this time.

## 2024-03-15 NOTE — DISCHARGE SUMMARY
Name:  Neyda Weinberg  Room:  0222/0222-01  MRN:    [id number]    Discharge Summary      This discharge summary is in conjunction with a complete physical exam done on the day of discharge.    Discharging Provider: Amy Cleaning PA-C    Admit: 3/14/2024  Discharge: 03/15/24      HPI taken from admission H&P:    The patient is a 53 y.o. female with PMHx HLD, HTN, and iron deficiency anemia currently being evaluated for suspected malignancy with T6 lesion and lung nodule. Patient admitted to [facility] following IR guided biopsy of T6 lesion this afternoon. Patient currently resides in homeless shelter without transportation and therefore was unable to safely be discharged after receiving anesthesia. Patient endorses mild pain, but otherwise, no fevers, chills, n/v/d, abdominal pain, chest pain or SOB.           Diagnoses this Admission and Hospital Course   #S/p image guided bone lesion biopsy T6  -admitted for 24h monitoring post-op  -plan for discharge in AM  -prn pain control  -outpatient follow up with oncology      #Right lung nodule  -s/p biopsy  -outpatient follow up with oncology     #Influenza A  -found to be positive on pre-admission testing  -had symptoms last week, now resolved   -supportive care      #HTN  -continue lisinopril     #HLD  -continue statin      #Iron deficiency anemia   -has required IV iron in the past  -continue ferrous sulfate   -outpatient follow up with PCP     #Tobacco use  -counseled cessation  -prn nicotine replacement      #Chronic pain  -continue lyrica      Procedures (Please Review Full Report for Details)  CT BIOPSY DEEP BONE PERCUTANEOUS     Consults    None      Physical Exam at Discharge:  /69   Pulse 75   Temp 98 °F (36.7 °C) (Oral)   Resp 16   Ht 1.631 m (5' 4.21\")   Wt 65 kg (143 lb 5 oz)   SpO2 96%   BMI 24.44 kg/m²   Gen: No distress. Alert.   Neck: No JVD.  No Carotid Bruit. Trachea midline.  Resp: No accessory muscle use. No crackles. No wheezes. No  rhonchi.   CV: Regular rate. Regular rhythm. No murmur.  No rub. No edema.   Peripheral Pulses: +2 palpable, equal bilaterally   Skin: Warm and dry. No nodule on exposed extremities. No rash on exposed extremities.   M/S: No cyanosis. No joint deformity. No clubbing.   Neuro: Awake. Grossly nonfocal    Psych: Oriented x 3. No anxiety or agitation.        CBC:   Recent Labs     03/14/24  0705   WBC 5.3   HGB 13.3   HCT 40.2   MCV 87.2        PT/INR:   Recent Labs     03/14/24  0705   PROTIME 13.1   INR 0.99         CULTURES  Results       Procedure Component Value Units Date/Time    COVID-19 & Influenza Combo [[id number]]  (Abnormal) Collected: 03/14/24 1449    Order Status: Completed Specimen: Nasal swab from Nasopharyngeal Swab Updated: 03/14/24 1512     SARS-CoV-2 RNA, RT PCR NOT DETECTED     Comment: Not Detected results do not preclude SARS-CoV-2 infection and  should not be used as the sole basis for patient management  decisions.  Results must be combined with clinical observations,  patient history, and epidemiological information.  Testing was performed using BECCA MADELINE SARS-CoV-2 and Influenza A/B  nucleic acid assay. This test is a multiplex Real-Time Reverse  Transcriptase Polymerase Chain Reaction (RT-PCR)-based in vitro  diagnostic test intended for the qualitative detection of nucleic  acids from SARS-CoV-2, influenza A, and influenza B in nasopharyngeal  and nasal swab specimens for use under the FDA’s Emergency Use  Authorization (EUA) only.    Patient Fact Sheet:  https://www.fda.gov/media/146878/download  Provider Fact Sheet: https://www.fda.gov/media/610266/download  EUA: https://www.fda.gov/media/580690/download  IFU: https://www.fda.gov/media/650276/download    Methodology:  RT-PCR          INFLUENZA A DETECTED     INFLUENZA B NOT DETECTED    COVID-19, Rapid [[id number]] Collected: 03/14/24 1223    Order Status: Completed Specimen: Nasopharyngeal Swab Updated: 03/14/24 1247

## 2024-03-15 NOTE — CARE COORDINATION
DC order noted. Transportation arrangements made through [facility] ([facility]) CM spoke with Kizzy. [facility] confirmed to   [address] [place] [id number]  Confirmation #[id number]  Pt's phone number confirmed [phone] and can receive text.  Discharge timeout done with Amy JOHNSON. All discharge needs and concerns addressed.

## 2024-03-15 NOTE — PLAN OF CARE
Problem: Discharge Planning  Goal: Discharge to home or other facility with appropriate resources  3/14/2024 2326 by Kvng Winkler, RN  Outcome: Progressing  Flowsheets (Taken 3/14/2024 2107)  Discharge to home or other facility with appropriate resources: Identify barriers to discharge with patient and caregiver  3/14/2024 1525 by Lizz Monique RN  Outcome: Progressing     Problem: Pain  Goal: Verbalizes/displays adequate comfort level or baseline comfort level  Outcome: Progressing

## 2024-04-10 ENCOUNTER — TELEPHONE (OUTPATIENT)
Dept: PULMONOLOGY | Age: 54
End: 2024-04-10

## 2024-04-10 NOTE — TELEPHONE ENCOUNTER
Referral from [facility] oncology for 0.7 cm RUL pulmonary nodule   Please see Dr. Apple in the next 1-2 weeks  Of note, it appears patient was [facility] to homeless [facility] last month   Had T6 bone bx during admission - pathology is negative   Also,   Please request CT Chests from [facility] from 3/3/2024 and 2/6/2024 into PACS  Please request CT Chests from [facility] from 12/26/2023

## 2024-04-10 NOTE — TELEPHONE ENCOUNTER
NPT R/B Asimeng, Pulmonary Nodule, COPD    Referral in Media, called [facility] and requested most recent imaging be pushed to "[email or web address] PACS      Please advise for scheduling

## 2024-04-13 PROBLEM — Z98.890 POST-OPERATIVE STATE: Status: RESOLVED | Noted: 2024-03-14 | Resolved: 2024-04-13
